# Patient Record
Sex: FEMALE | Race: WHITE | NOT HISPANIC OR LATINO | Employment: STUDENT | URBAN - NONMETROPOLITAN AREA
[De-identification: names, ages, dates, MRNs, and addresses within clinical notes are randomized per-mention and may not be internally consistent; named-entity substitution may affect disease eponyms.]

---
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
## 2024-07-22 ENCOUNTER — OFFICE VISIT (OUTPATIENT)
Dept: OBSTETRICS AND GYNECOLOGY | Age: 19
End: 2024-07-22
Payer: COMMERCIAL

## 2024-07-22 VITALS
WEIGHT: 177 LBS | HEIGHT: 68 IN | BODY MASS INDEX: 26.83 KG/M2 | SYSTOLIC BLOOD PRESSURE: 128 MMHG | DIASTOLIC BLOOD PRESSURE: 72 MMHG

## 2024-07-22 DIAGNOSIS — L70.0 ACNE VULGARIS: Primary | ICD-10-CM

## 2024-07-22 PROCEDURE — 99213 OFFICE O/P EST LOW 20 MIN: CPT | Performed by: NURSE PRACTITIONER

## 2024-07-22 RX ORDER — CLINDAMYCIN AND BENZOYL PEROXIDE 10; 50 MG/G; MG/G
1 GEL TOPICAL 2 TIMES DAILY
Qty: 50 G | Refills: 3 | Status: SHIPPED | OUTPATIENT
Start: 2024-07-22

## 2024-07-22 RX ORDER — SPIRONOLACTONE 25 MG/1
25 TABLET ORAL DAILY
Qty: 30 TABLET | Refills: 3 | Status: SHIPPED | OUTPATIENT
Start: 2024-07-22

## 2024-07-22 NOTE — PROGRESS NOTES
"Chief Complaint   Patient presents with    Contraception     Patient is here to discuss changing birth control due to worsening acne. Has been taking Ortho Cyclen OCP. Denies BTB or pelvic pain, reports periods are regular on OCP. Pt mother reports she stopped OCP for 2 months due to weight gain but has been back on it x 5 months.       History:  Staci Domingo is a 18 y.o. female who presents today for follow-up for evaluation of the above:    HPI    Patient presents today with c/o worsening facial acne. Has been on OCP with hope this would improve her acne. She has seen improvement in her periods and would like to continue OCP but would like to discuss additional options for acne treatment.           ROS:  Review of Systems   Constitutional: Negative.    HENT: Negative.          Acne   Eyes: Negative.    Respiratory: Negative.     Cardiovascular: Negative.    Gastrointestinal: Negative.    Endocrine: Negative.    Genitourinary: Negative.    Musculoskeletal: Negative.    Skin: Negative.    Neurological: Negative.    Psychiatric/Behavioral: Negative.         Ms. Domingo  reports that she has never smoked. She has never used smokeless tobacco. She reports that she does not drink alcohol and does not use drugs.      Current Outpatient Medications:     norgestimate-ethinyl estradiol (ORTHO-CYCLEN) 0.25-35 MG-MCG per tablet, Take 1 tablet by mouth Daily., Disp: 28 tablet, Rfl: 12    clindamycin-benzoyl peroxide (BenzaClin) 1-5 % gel, Apply 1 Application topically to the appropriate area as directed 2 (Two) Times a Day., Disp: 50 g, Rfl: 3    minocycline (MINOCIN,DYNACIN) 100 MG capsule, , Disp: , Rfl:     spironolactone (Aldactone) 25 MG tablet, Take 1 tablet by mouth Daily., Disp: 30 tablet, Rfl: 3      OBJECTIVE:  /72   Ht 172.7 cm (68\")   Wt 80.3 kg (177 lb)   LMP 07/08/2024 (Exact Date)   BMI 26.91 kg/m²    Physical Exam  Constitutional:       Appearance: She is not ill-appearing.   HENT:      Head:      " Comments: Facial acne  Pulmonary:      Effort: No respiratory distress.   Neurological:      Mental Status: She is oriented to person, place, and time.   Psychiatric:         Behavior: Behavior normal.         Assessment/Plan    Diagnoses and all orders for this visit:    1. Acne vulgaris (Primary)  -     spironolactone (Aldactone) 25 MG tablet; Take 1 tablet by mouth Daily.  Dispense: 30 tablet; Refill: 3  -     clindamycin-benzoyl peroxide (BenzaClin) 1-5 % gel; Apply 1 Application topically to the appropriate area as directed 2 (Two) Times a Day.  Dispense: 50 g; Refill: 3         An After Visit Summary was printed and given to the patient at discharge.  Return in about 8 weeks (around 9/16/2024). Sooner if problems arise.          Samira HERNANDEZ. 7/22/2024   Electronically Signed

## 2024-09-19 ENCOUNTER — OFFICE VISIT (OUTPATIENT)
Dept: OBSTETRICS AND GYNECOLOGY | Age: 19
End: 2024-09-19
Payer: COMMERCIAL

## 2024-09-19 VITALS
DIASTOLIC BLOOD PRESSURE: 76 MMHG | HEIGHT: 68 IN | WEIGHT: 182 LBS | SYSTOLIC BLOOD PRESSURE: 126 MMHG | BODY MASS INDEX: 27.58 KG/M2

## 2024-09-19 DIAGNOSIS — L70.0 ACNE VULGARIS: ICD-10-CM

## 2024-09-19 RX ORDER — SPIRONOLACTONE 50 MG/1
50 TABLET, FILM COATED ORAL DAILY
Qty: 30 TABLET | Refills: 11 | Status: SHIPPED | OUTPATIENT
Start: 2024-09-19

## 2024-09-19 RX ORDER — TRETINOIN 0.5 MG/G
1 CREAM TOPICAL NIGHTLY
Qty: 45 G | Refills: 11 | Status: SHIPPED | OUTPATIENT
Start: 2024-09-19

## 2024-12-27 DIAGNOSIS — L70.8 OTHER ACNE: ICD-10-CM

## 2024-12-27 RX ORDER — NORGESTIMATE AND ETHINYL ESTRADIOL 0.25-0.035
1 KIT ORAL DAILY
Qty: 28 TABLET | Refills: 0 | Status: SHIPPED | OUTPATIENT
Start: 2024-12-27 | End: 2024-12-30 | Stop reason: SDUPTHER

## 2024-12-30 ENCOUNTER — OFFICE VISIT (OUTPATIENT)
Dept: OBSTETRICS AND GYNECOLOGY | Age: 19
End: 2024-12-30
Payer: COMMERCIAL

## 2024-12-30 VITALS
BODY MASS INDEX: 28.79 KG/M2 | DIASTOLIC BLOOD PRESSURE: 80 MMHG | SYSTOLIC BLOOD PRESSURE: 112 MMHG | HEIGHT: 68 IN | WEIGHT: 190 LBS

## 2024-12-30 DIAGNOSIS — L70.0 ACNE VULGARIS: ICD-10-CM

## 2024-12-30 DIAGNOSIS — L70.8 OTHER ACNE: ICD-10-CM

## 2024-12-30 RX ORDER — SPIRONOLACTONE 50 MG/1
50 TABLET, FILM COATED ORAL DAILY
Qty: 30 TABLET | Refills: 11 | Status: SHIPPED | OUTPATIENT
Start: 2024-12-30

## 2024-12-30 RX ORDER — NORGESTIMATE AND ETHINYL ESTRADIOL 0.25-0.035
1 KIT ORAL DAILY
Qty: 28 TABLET | Refills: 11 | Status: SHIPPED | OUTPATIENT
Start: 2024-12-30

## 2024-12-30 NOTE — PROGRESS NOTES
"Chief Complaint   Patient presents with    Med Refill     Patient is here for medication recheck - Taking Mono-Linyah OCP, Spironolactone, Retin-A cream, and BenzaClin for hormonal acne control. Increased Spironolactone 9/19/24 and pt reports seeing huge improvement. Patient denies current pelvic pain, abnormal vaginal bleeding or discharge, and voices no other complaints.       History:  Staci Domingo is a 19 y.o. female who presents today for follow-up for evaluation of the above:    HPI    Patient presents today for medication f/u. She reports she is doing well on her current therapy.          ROS:  Review of Systems   Constitutional: Negative.    HENT: Negative.     Eyes: Negative.    Respiratory: Negative.     Cardiovascular: Negative.    Gastrointestinal: Negative.    Endocrine: Negative.    Genitourinary: Negative.    Musculoskeletal: Negative.    Skin: Negative.    Neurological: Negative.    Psychiatric/Behavioral: Negative.         Ms. Domingo  reports that she has never smoked. She has never used smokeless tobacco. She reports that she does not drink alcohol and does not use drugs.      Current Outpatient Medications:     clindamycin-benzoyl peroxide (BenzaClin) 1-5 % gel, Apply 1 Application topically to the appropriate area as directed 2 (Two) Times a Day., Disp: 50 g, Rfl: 3    norgestimate-ethinyl estradiol (Mono-Linyah) 0.25-35 MG-MCG per tablet, Take 1 tablet by mouth Daily., Disp: 28 tablet, Rfl: 11    spironolactone (Aldactone) 50 MG tablet, Take 1 tablet by mouth Daily., Disp: 30 tablet, Rfl: 11    tretinoin (RETIN-A) 0.05 % cream, Apply 1 Application topically to the appropriate area as directed Every Night., Disp: 45 g, Rfl: 11    minocycline (MINOCIN,DYNACIN) 100 MG capsule, , Disp: , Rfl:       OBJECTIVE:  /80   Ht 172.7 cm (68\")   Wt 86.2 kg (190 lb)   LMP 12/26/2024 (Exact Date)   BMI 28.89 kg/m²    Physical Exam  Constitutional:       Appearance: She is not ill-appearing. "   Pulmonary:      Effort: No respiratory distress.   Neurological:      Mental Status: She is oriented to person, place, and time.   Psychiatric:         Behavior: Behavior normal.         Assessment/Plan    Diagnoses and all orders for this visit:    1. Acne vulgaris  -     spironolactone (Aldactone) 50 MG tablet; Take 1 tablet by mouth Daily.  Dispense: 30 tablet; Refill: 11    2. Other acne  -     norgestimate-ethinyl estradiol (Mono-Linyah) 0.25-35 MG-MCG per tablet; Take 1 tablet by mouth Daily.  Dispense: 28 tablet; Refill: 11    Stable on current therapy.          An After Visit Summary was printed and given to the patient at discharge.  Return in about 8 months (around 8/30/2025) for Annual physical-first pap. Sooner if problems arise.          Samira HERNANDEZ. 12/30/2024   Electronically Signed

## (undated) DEVICE — GLV SURG BIOGEL M LTX PF 7 1/2

## (undated) DEVICE — PK TURNOVER RM ADV

## (undated) DEVICE — SUT VIC 0 UR6 27IN VCP603H

## (undated) DEVICE — TUBING, SUCTION, 1/4" X 12', STRAIGHT: Brand: MEDLINE

## (undated) DEVICE — ELECTRD BLD EDGE/INSUL1P 2.4X5.1MM STRL

## (undated) DEVICE — PAD GRND REM POLYHESIVE A/ DISP

## (undated) DEVICE — PAD LAP CHOLE: Brand: MEDLINE INDUSTRIES, INC.

## (undated) DEVICE — SUT VIC 4/0 P3 18IN UD VCP494H

## (undated) DEVICE — 2, DISPOSABLE SUCTION/IRRIGATOR WITHOUT DISPOSABLE TIP: Brand: STRYKEFLOW

## (undated) DEVICE — TOTAL TRAY, 16FR 10ML SIL FOLEY, URN: Brand: MEDLINE

## (undated) DEVICE — TOWEL,OR,DSP,ST,BLUE,DLX,10/PK,8PK/CS: Brand: MEDLINE

## (undated) DEVICE — 3M™ STERI-STRIP™ REINFORCED ADHESIVE SKIN CLOSURES, R1546, 1/4 IN X 4 IN (6 MM X 100 MM), 10 STRIPS/ENVELOPE: Brand: 3M™ STERI-STRIP™

## (undated) DEVICE — LAPAROSCOPIC MONOPOLAR CORD: Brand: VALLEYLAB

## (undated) DEVICE — SYR LUERLOK 50ML

## (undated) DEVICE — TOWEL,OR,DSP,ST,BLUE,STD,4/PK,20PK/CS: Brand: MEDLINE

## (undated) DEVICE — INTEGRA® MICRO ENT BLADE,DOWNCUTTING BLADE, ANGLED SHAFT: Brand: INTEGRA®

## (undated) DEVICE — ENDOPOUCH RETRIEVER SPECIMEN RETRIEVAL BAGS: Brand: ENDOPOUCH RETRIEVER

## (undated) DEVICE — SPNG GZ WOVN 4X4IN 12PLY 10/BX STRL

## (undated) DEVICE — ENDOPATH XCEL BLUNT TIP TROCARS WITH SMOOTH SLEEVES: Brand: ENDOPATH XCEL

## (undated) DEVICE — MONOPOLAR METZENBAUM SCISSOR, MINI BLADE TIP, DISPOSABLE: Brand: MONOPOLAR METZENBAUM SCISSOR, MINI BLADE TIP, DISPOSABLE

## (undated) DEVICE — TRY PREP SCRB VAG PVP

## (undated) DEVICE — CLTH CLENS READYCLEANSE PERI CARE PK/5

## (undated) DEVICE — SURGICAL SUCTION CONNECTING TUBE WITH MALE CONNECTOR AND SUCTION CLAMP, 2 FT. LONG (.6 M), 5 MM I.D.: Brand: CONMED

## (undated) DEVICE — 3M™ IOBAN™ 2 ANTIMICROBIAL INCISE DRAPE 6650EZ: Brand: IOBAN™ 2

## (undated) DEVICE — ENDOPATH ETS-FLEX45 ARTICULATING ENDOSCOPIC LINEAR CUTTER, NO RELOAD: Brand: ENDOPATH

## (undated) DEVICE — SUT VIC 3/0 RB1 27IN UD VCP215H

## (undated) DEVICE — GLV SURG TRIUMPH MICRO PF LTX 7.5 STRL